# Patient Record
Sex: MALE | Race: WHITE
[De-identification: names, ages, dates, MRNs, and addresses within clinical notes are randomized per-mention and may not be internally consistent; named-entity substitution may affect disease eponyms.]

---

## 2017-06-06 ENCOUNTER — HOSPITAL ENCOUNTER (EMERGENCY)
Dept: HOSPITAL 62 - ER | Age: 72
Discharge: HOME | End: 2017-06-06
Payer: COMMERCIAL

## 2017-06-06 DIAGNOSIS — S50.819A: Primary | ICD-10-CM

## 2017-06-06 DIAGNOSIS — S16.1XXA: ICD-10-CM

## 2017-06-06 DIAGNOSIS — M54.2: ICD-10-CM

## 2017-06-06 DIAGNOSIS — S09.90XA: ICD-10-CM
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
## 2017-06-06 LAB
ALBUMIN SERPL-MCNC: 4.1 G/DL (ref 3.5–5)
ALP SERPL-CCNC: 68 U/L (ref 38–126)
ALT SERPL-CCNC: 31 U/L (ref 21–72)
ANION GAP SERPL CALC-SCNC: 11 MMOL/L (ref 5–19)
APTT BLD: 33 SEC (ref 23.5–35.8)
AST SERPL-CCNC: 27 U/L (ref 17–59)
BILIRUB DIRECT SERPL-MCNC: 0.2 MG/DL (ref 0–0.4)
BILIRUB SERPL-MCNC: 0.5 MG/DL (ref 0.2–1.3)
BUN SERPL-MCNC: 18 MG/DL (ref 7–20)
CALCIUM: 9.7 MG/DL (ref 8.4–10.2)
CHLORIDE SERPL-SCNC: 102 MMOL/L (ref 98–107)
CO2 SERPL-SCNC: 27 MMOL/L (ref 22–30)
CREAT SERPL-MCNC: 0.92 MG/DL (ref 0.52–1.25)
ERYTHROCYTE [DISTWIDTH] IN BLOOD BY AUTOMATED COUNT: 13.2 % (ref 11.5–14)
GLUCOSE SERPL-MCNC: 141 MG/DL (ref 75–110)
HCT VFR BLD CALC: 38.8 % (ref 37.9–51)
HGB BLD-MCNC: 13.3 G/DL (ref 13.5–17)
HGB HCT DIFFERENCE: 1.1
MCH RBC QN AUTO: 29.8 PG (ref 27–33.4)
MCHC RBC AUTO-ENTMCNC: 34.3 G/DL (ref 32–36)
MCV RBC AUTO: 87 FL (ref 80–97)
POTASSIUM SERPL-SCNC: 3.9 MMOL/L (ref 3.6–5)
PROT SERPL-MCNC: 7.2 G/DL (ref 6.3–8.2)
PROTHROMBIN TIME: 12.8 SEC (ref 11.4–15.4)
RBC # BLD AUTO: 4.47 10^6/UL (ref 4.35–5.55)
SODIUM SERPL-SCNC: 139.8 MMOL/L (ref 137–145)
WBC # BLD AUTO: 5.5 10^3/UL (ref 4–10.5)

## 2017-06-06 PROCEDURE — 72125 CT NECK SPINE W/O DYE: CPT

## 2017-06-06 PROCEDURE — 80053 COMPREHEN METABOLIC PANEL: CPT

## 2017-06-06 PROCEDURE — 85730 THROMBOPLASTIN TIME PARTIAL: CPT

## 2017-06-06 PROCEDURE — 99285 EMERGENCY DEPT VISIT HI MDM: CPT

## 2017-06-06 PROCEDURE — 70450 CT HEAD/BRAIN W/O DYE: CPT

## 2017-06-06 PROCEDURE — 85610 PROTHROMBIN TIME: CPT

## 2017-06-06 PROCEDURE — 85027 COMPLETE CBC AUTOMATED: CPT

## 2017-06-06 PROCEDURE — 36415 COLL VENOUS BLD VENIPUNCTURE: CPT
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
## 2017-06-07 VITALS — DIASTOLIC BLOOD PRESSURE: 86 MMHG | SYSTOLIC BLOOD PRESSURE: 126 MMHG

## 2019-10-29 ENCOUNTER — IMPORTED ENCOUNTER (OUTPATIENT)
Dept: URBAN - NONMETROPOLITAN AREA CLINIC 1 | Facility: CLINIC | Age: 74
End: 2019-10-29

## 2019-10-29 PROBLEM — H25.813: Noted: 2019-10-29

## 2019-10-29 PROCEDURE — 92004 COMPRE OPH EXAM NEW PT 1/>: CPT

## 2019-10-29 NOTE — PATIENT DISCUSSION
Cataract(s)-Visually significant cataract OU.-Cataract(s) causing symptomatic impairment of visual function not correctable with a tolerable change in glasses or contact lenses lighting or non-operative means resulting in specific activity limitations and/or participation restrictions including but not limited to reading viewing television driving or meeting vocational or recreational needs. -Expectation is clearer vision and functional improvement in symptoms as well as reduced glare disability after cataract removal.-Order IOLMaster and OPD today. -Recommend standard/traditional OU based on today's OPD testing and lifestyle questionnaire.-All questions were answered regarding surgery including pre and post-op medications appointments activity restrictions and anesthetic usage.-The risks benefits and alternatives and special risk factors for the patient were discussed in detail including but not limited to: bleeding infection retinal detachment vitreous loss problems with the implant and possible need for additional surgery.-Although rare the possibility of complete vision loss was discussed.-The possible need for glasses post-operatively was discussed.-Order medical clearance exam based on history of hypertension and shortness of  breath -Patient elects to proceed with cataract surgery OD. Will schedule at patient's convenience and re-evaluate OS in the future. Discussed all lens options in detail with patient.

## 2019-11-04 ENCOUNTER — IMPORTED ENCOUNTER (OUTPATIENT)
Dept: URBAN - NONMETROPOLITAN AREA CLINIC 1 | Facility: CLINIC | Age: 74
End: 2019-11-04

## 2019-11-04 PROBLEM — H25.813: Noted: 2019-11-04

## 2019-12-03 ENCOUNTER — IMPORTED ENCOUNTER (OUTPATIENT)
Dept: URBAN - NONMETROPOLITAN AREA CLINIC 1 | Facility: CLINIC | Age: 74
End: 2019-12-03

## 2019-12-03 PROBLEM — D64.9: Noted: 2019-12-03

## 2019-12-03 PROBLEM — Z01.818: Noted: 2019-12-03

## 2019-12-03 PROBLEM — R06.02: Noted: 2019-12-03

## 2019-12-03 PROBLEM — I10: Noted: 2019-12-03

## 2020-01-21 ENCOUNTER — IMPORTED ENCOUNTER (OUTPATIENT)
Dept: URBAN - NONMETROPOLITAN AREA CLINIC 1 | Facility: CLINIC | Age: 75
End: 2020-01-21

## 2020-01-21 PROBLEM — H25.813: Noted: 2020-01-21

## 2020-01-21 NOTE — PATIENT DISCUSSION
Cataract OU-Visually significant cataract OU.-Cataract(s) causing symptomatic impairment of visual function not correctable with a tolerable change in glasses or contact lenses lighting or non-operative means resulting in specific activity limitations and/or participation restrictions including but not limited to reading viewing television driving or meeting vocational or recreational needs. -Expectation is clearer vision and functional improvement in symptoms as well as reduced glare disability after cataract removal.-Recommend Standard/Traditional OU based on today's OPD testing and lifestyle questionnaire.-All questions were answered regarding surgery including pre and post-op medications appointments activity restrictions and anesthetic usage.-The risks benefits and alternatives and special risk factors for the patient were discussed in detail including but not limited to: bleeding infection retinal detachment vitreous loss problems with the implant and possible need for additional surgery.-Although rare the possibility of complete vision loss was discussed.-The possible need for glasses post-operatively was discussed.-Patient elects to proceed with cataract surgery OD. Will schedule at patient's convenience and re-evaluate OS in the future. ***Patient elects Standard/Traditional OU starting with OD.

## 2020-01-31 ENCOUNTER — IMPORTED ENCOUNTER (OUTPATIENT)
Dept: URBAN - NONMETROPOLITAN AREA CLINIC 1 | Facility: CLINIC | Age: 75
End: 2020-01-31

## 2020-01-31 PROBLEM — Z98.41: Noted: 2020-01-31

## 2020-01-31 PROCEDURE — 66984 XCAPSL CTRC RMVL W/O ECP: CPT

## 2020-01-31 PROCEDURE — 92136 OPHTHALMIC BIOMETRY: CPT

## 2020-01-31 PROCEDURE — 99024 POSTOP FOLLOW-UP VISIT: CPT

## 2020-01-31 NOTE — PATIENT DISCUSSION
Same day s/p PCIOL OD-Pt doing well s/p PCIOL. -Continue post-op gtts according to instruction sheet and sleep with eye shield over eye for 7 nights.-Avoid bending at the waist lifting anything over 5lbs and dirty or juliann

## 2020-02-05 ENCOUNTER — IMPORTED ENCOUNTER (OUTPATIENT)
Dept: URBAN - NONMETROPOLITAN AREA CLINIC 1 | Facility: CLINIC | Age: 75
End: 2020-02-05

## 2020-02-05 PROBLEM — I10: Noted: 2020-02-05

## 2020-02-05 PROBLEM — H25.812: Noted: 2020-02-05

## 2020-02-05 PROBLEM — Z98.41: Noted: 2020-02-05

## 2020-02-05 PROBLEM — Z01.818: Noted: 2020-02-05

## 2020-02-05 PROBLEM — R06.02: Noted: 2020-02-05

## 2020-02-05 PROCEDURE — 99024 POSTOP FOLLOW-UP VISIT: CPT

## 2020-02-05 NOTE — PATIENT DISCUSSION
Cataract OS-Visually significant cataract OS. -Cataract causing symptomatic impairment of visual function not correctable with a tolerable change in glasses or contact lenses lighting or non-operative means resulting in specific activity limitations and/or participation restrictions including but not limited to reading viewing television driving or meeting vocational or recreational needs. -Expectation is clearer vision and functional improvement in symptoms as well as reduced glare disability after cataract removal.-Recommend standard/traditional based on previous OPD testing and lifestyle questionnaire.-All questions were answered regarding surgery including pre and post-op medications appointments activity restrictions and anesthetic usage.-The risks benefits and alternatives and special risk factors for the patient were discussed in detail including but not limited to: bleeding infection retinal detachment vitreous loss problems with the implant and possible need for additional surgery.-Although rare the possibility of complete vision loss was discussed.-The need for glasses post-operatively was discussed.-Patient elects to proceed with cataract surgery OS. Will schedule at patient's convenience. 5 day s/p PCIOL OD-Pt doing well at 5 day s/p PCIOL OD.-Continue post-op gtts according to instruction sheet.-Okay to resume usual activities and d/c eye shield.

## 2022-04-09 ASSESSMENT — VISUAL ACUITY
OD_PAM: 20/20-
OD_SC: 20/40
OS_AM: 20/20
OS_SC: 20/25+
OS_GLARE: 20/400
OS_AM: 20/20
OD_CC: 20/50
OD_CC: 20/40
OS_SC: 20/25+
OD_PAM: 20/20-
OD_GLARE: 20/400
OD_CC: 20/50
OD_GLARE: 20/400
OS_CC: 20/30
OD_SC: 20/40
OD_GLARE: 20/400
OS_AM: 20/20
OS_GLARE: 20/400
OD_PAM: 20/20
OS_CC: 20/200
OS_GLARE: 20/400
OD_GLARE: 20/400
OS_GLARE: 20/400
OS_AM: 20/20
OS_SC: 20/25+
OS_AM: 20/20
OD_PAM: 20/20-
OS_GLARE: 20/400
OD_SC: 20/40
OS_CC: 20/30
OS_CC: 20/30
OS_CC: 20/200
OS_AM: 20/20
OS_GLARE: 20/400

## 2022-04-09 ASSESSMENT — TONOMETRY
OS_IOP_MMHG: 14
OS_IOP_MMHG: 15
OD_IOP_MMHG: 15
OS_IOP_MMHG: 14
OS_IOP_MMHG: 16
OD_IOP_MMHG: 16
OD_IOP_MMHG: 14
OD_IOP_MMHG: 15